# Patient Record
Sex: MALE | Race: WHITE | NOT HISPANIC OR LATINO | URBAN - METROPOLITAN AREA
[De-identification: names, ages, dates, MRNs, and addresses within clinical notes are randomized per-mention and may not be internally consistent; named-entity substitution may affect disease eponyms.]

---
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
## 2022-04-10 ENCOUNTER — HISTORICAL (OUTPATIENT)
Dept: ADMINISTRATIVE | Facility: HOSPITAL | Age: 22
End: 2022-04-10

## 2022-04-11 ENCOUNTER — HISTORICAL (OUTPATIENT)
Dept: ADMINISTRATIVE | Facility: HOSPITAL | Age: 22
End: 2022-04-11

## 2022-04-28 VITALS
DIASTOLIC BLOOD PRESSURE: 74 MMHG | BODY MASS INDEX: 44.1 KG/M2 | SYSTOLIC BLOOD PRESSURE: 114 MMHG | HEIGHT: 71 IN | WEIGHT: 315 LBS

## 2022-04-28 VITALS
HEIGHT: 71 IN | SYSTOLIC BLOOD PRESSURE: 114 MMHG | WEIGHT: 315 LBS | DIASTOLIC BLOOD PRESSURE: 74 MMHG | BODY MASS INDEX: 44.1 KG/M2

## 2022-09-21 ENCOUNTER — HISTORICAL (OUTPATIENT)
Dept: ADMINISTRATIVE | Facility: HOSPITAL | Age: 22
End: 2022-09-21

## 2023-02-09 ENCOUNTER — OFFICE VISIT (OUTPATIENT)
Dept: FAMILY MEDICINE | Facility: CLINIC | Age: 23
End: 2023-02-09
Payer: COMMERCIAL

## 2023-02-09 VITALS
SYSTOLIC BLOOD PRESSURE: 122 MMHG | WEIGHT: 315 LBS | TEMPERATURE: 99 F | DIASTOLIC BLOOD PRESSURE: 72 MMHG | HEART RATE: 84 BPM | HEIGHT: 72 IN | BODY MASS INDEX: 42.66 KG/M2 | OXYGEN SATURATION: 95 % | RESPIRATION RATE: 18 BRPM

## 2023-02-09 DIAGNOSIS — J02.0 STREP THROAT: Primary | ICD-10-CM

## 2023-02-09 DIAGNOSIS — J02.9 SORE THROAT: ICD-10-CM

## 2023-02-09 DIAGNOSIS — G47.33 OBSTRUCTIVE SLEEP APNEA: ICD-10-CM

## 2023-02-09 LAB
CTP QC/QA: YES
S PYO RRNA THROAT QL PROBE: POSITIVE

## 2023-02-09 PROCEDURE — 1159F MED LIST DOCD IN RCRD: CPT | Mod: CPTII,,, | Performed by: NURSE PRACTITIONER

## 2023-02-09 PROCEDURE — 1160F PR REVIEW ALL MEDS BY PRESCRIBER/CLIN PHARMACIST DOCUMENTED: ICD-10-PCS | Mod: CPTII,,, | Performed by: NURSE PRACTITIONER

## 2023-02-09 PROCEDURE — 3008F BODY MASS INDEX DOCD: CPT | Mod: CPTII,,, | Performed by: NURSE PRACTITIONER

## 2023-02-09 PROCEDURE — 3078F DIAST BP <80 MM HG: CPT | Mod: CPTII,,, | Performed by: NURSE PRACTITIONER

## 2023-02-09 PROCEDURE — 3074F SYST BP LT 130 MM HG: CPT | Mod: CPTII,,, | Performed by: NURSE PRACTITIONER

## 2023-02-09 PROCEDURE — 3078F PR MOST RECENT DIASTOLIC BLOOD PRESSURE < 80 MM HG: ICD-10-PCS | Mod: CPTII,,, | Performed by: NURSE PRACTITIONER

## 2023-02-09 PROCEDURE — 3008F PR BODY MASS INDEX (BMI) DOCUMENTED: ICD-10-PCS | Mod: CPTII,,, | Performed by: NURSE PRACTITIONER

## 2023-02-09 PROCEDURE — 1160F RVW MEDS BY RX/DR IN RCRD: CPT | Mod: CPTII,,, | Performed by: NURSE PRACTITIONER

## 2023-02-09 PROCEDURE — 1159F PR MEDICATION LIST DOCUMENTED IN MEDICAL RECORD: ICD-10-PCS | Mod: CPTII,,, | Performed by: NURSE PRACTITIONER

## 2023-02-09 PROCEDURE — 99213 PR OFFICE/OUTPT VISIT, EST, LEVL III, 20-29 MIN: ICD-10-PCS | Mod: ,,, | Performed by: NURSE PRACTITIONER

## 2023-02-09 PROCEDURE — 99213 OFFICE O/P EST LOW 20 MIN: CPT | Mod: ,,, | Performed by: NURSE PRACTITIONER

## 2023-02-09 PROCEDURE — 87880 STREP A ASSAY W/OPTIC: CPT | Mod: QW,,, | Performed by: NURSE PRACTITIONER

## 2023-02-09 PROCEDURE — 87880 POCT RAPID STREP A: ICD-10-PCS | Mod: QW,,, | Performed by: NURSE PRACTITIONER

## 2023-02-09 PROCEDURE — 3074F PR MOST RECENT SYSTOLIC BLOOD PRESSURE < 130 MM HG: ICD-10-PCS | Mod: CPTII,,, | Performed by: NURSE PRACTITIONER

## 2023-02-09 RX ORDER — AMOXICILLIN 875 MG/1
875 TABLET, FILM COATED ORAL EVERY 12 HOURS
Qty: 20 TABLET | Refills: 0 | Status: SHIPPED | OUTPATIENT
Start: 2023-02-09 | End: 2023-02-19

## 2023-02-09 NOTE — LETTER
February 9, 2023      [facility]  [address]  [place] [address]  Phone: [phone]  Fax: [phone]       Patient: Allen Villegas   YOB: 2000  Date of Visit: 02/09/2023    To Whom It May Concern:    Blaise Villegas  was at [facility] on 02/09/2023. The patient may return to work/school on 02/11/2023 with no restrictions. If you have any questions or concerns, or if I can be of further assistance, please do not hesitate to contact me.    Sincerely,    VICKEY Slaughter

## 2023-02-09 NOTE — PROGRESS NOTES
Subjective:       Patient ID: Allen Villegas is a 22 y.o. male.    Chief Complaint: Sore Throat (Since Monday) and Fever    Sore Throat   This is a new problem. The current episode started in the past 7 days. The problem has been gradually worsening. There has been no fever. Associated symptoms include congestion, coughing, headaches and a plugged ear sensation. Pertinent negatives include no abdominal pain, ear pain, shortness of breath or trouble swallowing. He has had exposure to strep.   Review of Systems   Constitutional:  Negative for activity change, appetite change, chills, fatigue and fever.   HENT:  Positive for nasal congestion and sore throat. Negative for ear pain, sinus pressure/congestion, tinnitus and trouble swallowing.    Eyes:  Negative for pain, discharge and eye dryness.   Respiratory:  Positive for cough. Negative for apnea, chest tightness and shortness of breath.    Cardiovascular:  Negative for chest pain.   Gastrointestinal:  Negative for abdominal distention and abdominal pain.   Endocrine: Negative for cold intolerance and heat intolerance.   Genitourinary:  Negative for bladder incontinence and difficulty urinating.   Musculoskeletal:  Negative for arthralgias, back pain and gait problem.   Allergic/Immunologic: Negative for environmental allergies, food allergies and frequent infections.   Neurological:  Positive for headaches. Negative for dizziness, coordination difficulties, memory loss and coordination difficulties.   Psychiatric/Behavioral:  Negative for sleep disturbance and suicidal ideas. The patient is not nervous/anxious.        Objective:      Physical Exam  Vitals and nursing note reviewed.   Constitutional:       Appearance: Normal appearance.   HENT:      Head: Normocephalic and atraumatic.      Right Ear: Tympanic membrane, ear canal and external ear normal.      Left Ear: Ear canal and external ear normal. Tympanic membrane is erythematous.      Nose: Nose normal.       Mouth/Throat:      Mouth: Mucous membranes are moist.      Pharynx: Oropharynx is clear. Posterior oropharyngeal erythema present.   Eyes:      Extraocular Movements: Extraocular movements intact.      Conjunctiva/sclera: Conjunctivae normal.      Pupils: Pupils are equal, round, and reactive to light.   Cardiovascular:      Rate and Rhythm: Normal rate and regular rhythm.      Pulses: Normal pulses.      Heart sounds: Normal heart sounds.   Pulmonary:      Effort: Pulmonary effort is normal.      Breath sounds: Normal breath sounds.   Abdominal:      General: Abdomen is flat. Bowel sounds are normal.      Palpations: Abdomen is soft.   Musculoskeletal:         General: Normal range of motion.      Cervical back: Normal range of motion.   Skin:     General: Skin is warm and dry.      Capillary Refill: Capillary refill takes less than 2 seconds.   Neurological:      General: No focal deficit present.      Mental Status: He is alert and oriented to person, place, and time.   Psychiatric:         Attention and Perception: Attention and perception normal.         Mood and Affect: Mood and affect normal.         Speech: Speech normal.         Behavior: Behavior normal. Behavior is cooperative.         Thought Content: Thought content normal.         Cognition and Memory: Cognition normal.       Assessment/Plan:     Vitals:    02/09/23 1627   BP: 122/72   Pulse: 84   Resp: 18   Temp: 98.9 °F (37.2 °C)        1. Strep throat  Overview:  Amoxicillin bid x7days     Orders:  -     amoxicillin (AMOXIL) 875 MG tablet; Take 1 tablet (875 mg total) by mouth every 12 (twelve) hours. for 10 days  Dispense: 20 tablet; Refill: 0    2. Sore throat  -     POCT rapid strep A    3. Obstructive sleep apnea           Follow up if symptoms worsen or fail to improve.   Discussed the diagnosis, prognosis, plan of care, chronic nature of and indications for, risks and benefits of treatment for conditions.  Continue all medications as  prescribed unless otherwise noted.   Call if patient develops other symptoms or if not better in 2-3 days and sooner if worse. Emphasized the importance of compliance with all recommendations, including medication use and timely follow up. Instructed to return as noted be or sooner if patient develops any other problems or if there are any other additional questions or concerns.

## 2024-02-28 ENCOUNTER — OFFICE VISIT (OUTPATIENT)
Dept: FAMILY MEDICINE | Facility: CLINIC | Age: 24
End: 2024-02-28
Payer: COMMERCIAL

## 2024-02-28 VITALS
HEIGHT: 72 IN | HEART RATE: 75 BPM | BODY MASS INDEX: 42.66 KG/M2 | WEIGHT: 315 LBS | DIASTOLIC BLOOD PRESSURE: 70 MMHG | SYSTOLIC BLOOD PRESSURE: 121 MMHG | TEMPERATURE: 98 F | OXYGEN SATURATION: 99 % | RESPIRATION RATE: 20 BRPM

## 2024-02-28 DIAGNOSIS — K52.9 GASTROENTERITIS: ICD-10-CM

## 2024-02-28 DIAGNOSIS — J02.9 PHARYNGITIS, UNSPECIFIED ETIOLOGY: Primary | ICD-10-CM

## 2024-02-28 LAB
CTP QC/QA: YES
FLUAV AG NPH QL: NEGATIVE
FLUBV AG NPH QL: NEGATIVE
S PYO RRNA THROAT QL PROBE: NEGATIVE
SARS-COV-2 AG RESP QL IA.RAPID: NEGATIVE

## 2024-02-28 PROCEDURE — 99214 OFFICE O/P EST MOD 30 MIN: CPT | Mod: ,,, | Performed by: NURSE PRACTITIONER

## 2024-02-28 PROCEDURE — 3074F SYST BP LT 130 MM HG: CPT | Mod: CPTII,,, | Performed by: NURSE PRACTITIONER

## 2024-02-28 PROCEDURE — 87880 STREP A ASSAY W/OPTIC: CPT | Mod: QW,,, | Performed by: NURSE PRACTITIONER

## 2024-02-28 PROCEDURE — 87400 INFLUENZA A/B EACH AG IA: CPT | Mod: 59,QW,, | Performed by: NURSE PRACTITIONER

## 2024-02-28 PROCEDURE — 3008F BODY MASS INDEX DOCD: CPT | Mod: CPTII,,, | Performed by: NURSE PRACTITIONER

## 2024-02-28 PROCEDURE — 3078F DIAST BP <80 MM HG: CPT | Mod: CPTII,,, | Performed by: NURSE PRACTITIONER

## 2024-02-28 PROCEDURE — 87811 SARS-COV-2 COVID19 W/OPTIC: CPT | Mod: QW,,, | Performed by: NURSE PRACTITIONER

## 2024-02-28 PROCEDURE — 1159F MED LIST DOCD IN RCRD: CPT | Mod: CPTII,,, | Performed by: NURSE PRACTITIONER

## 2024-02-28 PROCEDURE — 1160F RVW MEDS BY RX/DR IN RCRD: CPT | Mod: CPTII,,, | Performed by: NURSE PRACTITIONER

## 2024-02-28 RX ORDER — ONDANSETRON HYDROCHLORIDE 8 MG/1
8 TABLET, FILM COATED ORAL EVERY 8 HOURS PRN
Qty: 14 TABLET | Refills: 0 | Status: SHIPPED | OUTPATIENT
Start: 2024-02-28

## 2024-02-28 NOTE — PROGRESS NOTES
Subjective:       Patient ID: Allen Villegas is a 23 y.o. male.    Chief Complaint: Nausea, Cough (Started 5 days ago), Nasal Congestion, vomiting (Last night), and Sore Throat    Nausea  Associated symptoms include abdominal pain, congestion, coughing, fatigue, headaches, nausea, a sore throat and vomiting. Pertinent negatives include no neck pain or swollen glands.   Cough  Associated symptoms include headaches and a sore throat. Pertinent negatives include no ear pain or shortness of breath.   Sore Throat   This is a new problem. The current episode started in the past 7 days. The problem has been waxing and waning. Neither side of throat is experiencing more pain than the other. There has been no fever. The pain is at a severity of 6/10. The pain is mild. Associated symptoms include abdominal pain, congestion, coughing, headaches, a hoarse voice, a plugged ear sensation and vomiting. Pertinent negatives include no diarrhea, drooling, ear discharge, ear pain, neck pain, shortness of breath, stridor, swollen glands or trouble swallowing. He has had no exposure to strep or mono. He has tried NSAIDs for the symptoms. The treatment provided mild relief.     Review of Systems   Constitutional:  Positive for appetite change and fatigue.   HENT:  Positive for nasal congestion, hoarse voice and sore throat. Negative for drooling, ear discharge, ear pain and trouble swallowing.    Respiratory:  Positive for cough. Negative for shortness of breath and stridor.    Gastrointestinal:  Positive for abdominal pain, nausea and vomiting. Negative for diarrhea.   Musculoskeletal: Negative.  Negative for neck pain.   Integumentary:  Negative.   Neurological:  Positive for headaches.   Hematological: Negative.    Psychiatric/Behavioral: Negative.           Objective:      Vitals:    02/28/24 1058   BP: 121/70   Pulse: 75   Resp: 20   Temp: 97.8 °F (36.6 °C)   SpO2: 99%   Weight: (!) 146.5 kg (323 lb)   Height: 6' (1.829 m)        Physical Exam  Vitals and nursing note reviewed.   HENT:      Nose: Rhinorrhea present.      Mouth/Throat:      Pharynx: Posterior oropharyngeal erythema present.   Cardiovascular:      Rate and Rhythm: Normal rate and regular rhythm.      Pulses: Normal pulses.      Heart sounds: Normal heart sounds.   Pulmonary:      Effort: Pulmonary effort is normal.      Breath sounds: Normal breath sounds.   Abdominal:      General: Bowel sounds are normal.      Palpations: Abdomen is soft.   Musculoskeletal:         General: Normal range of motion.      Cervical back: Normal range of motion.   Skin:     General: Skin is warm.   Neurological:      Mental Status: He is oriented to person, place, and time.   Psychiatric:         Mood and Affect: Mood normal.         Assessment/Plan:     1. Pharyngitis, unspecified etiology  Assessment & Plan:  Negative strept, flu, covid. Comfort measures.     Orders:  -     POCT Influenza A/B  -     POCT rapid strep A  -     SARS Coronavirus 2 Antigen, POCT Manual Read    2. Gastroenteritis  Assessment & Plan:  Zorfan 8 mg tid with bland diet. Call any problems.     Orders:  -     ondansetron (ZOFRAN) 8 MG tablet; Take 1 tablet (8 mg total) by mouth every 8 (eight) hours as needed for Nausea.  Dispense: 14 tablet; Refill: 0       No follow-ups on file.          Discussed the diagnosis, prognosis, plan of care, chronic nature of and indications for, risks and benefits of treatment for conditions.  Continue all medications as prescribed unless otherwise noted.   Call if patient develops other symptoms or if not better in 2-3 days and sooner if worse. Emphasized the importance of compliance with all recommendations, including medication use and timely follow up. Instructed to return as noted be or sooner if patient develops any other problems or if there are any other additional questions or concerns.

## 2024-02-28 NOTE — LETTER
February 28, 2024      [facility]  [address] [place] [address]  Phone: [phone]  Fax: [phone]       Patient: Allen Villegas   YOB: 2000  Date of Visit: 02/28/2024    To Whom It May Concern:    Blaise Villegas  was at [facility] on 02/28/2024. The patient may return to work on 02/29/2024 with no restrictions. If you have any questions or concerns, or if I can be of further assistance, please do not hesitate to contact me.    Sincerely,    Pat Larios LPN

## 2024-03-01 LAB — S PYO THROAT QL CULT: NEGATIVE

## 2024-04-11 ENCOUNTER — OFFICE VISIT (OUTPATIENT)
Dept: FAMILY MEDICINE | Facility: CLINIC | Age: 24
End: 2024-04-11
Payer: COMMERCIAL

## 2024-04-11 VITALS
HEART RATE: 106 BPM | SYSTOLIC BLOOD PRESSURE: 122 MMHG | DIASTOLIC BLOOD PRESSURE: 74 MMHG | OXYGEN SATURATION: 96 % | WEIGHT: 315 LBS | BODY MASS INDEX: 42.66 KG/M2 | RESPIRATION RATE: 20 BRPM | HEIGHT: 72 IN | TEMPERATURE: 99 F

## 2024-04-11 DIAGNOSIS — R50.9 FEVER, UNSPECIFIED FEVER CAUSE: ICD-10-CM

## 2024-04-11 DIAGNOSIS — U07.1 COVID: Primary | ICD-10-CM

## 2024-04-11 LAB
CTP QC/QA: YES
FLUAV AG NPH QL: NEGATIVE
FLUBV AG NPH QL: NEGATIVE
S PYO RRNA THROAT QL PROBE: NEGATIVE
SARS-COV-2 AG RESP QL IA.RAPID: POSITIVE

## 2024-04-11 PROCEDURE — 87880 STREP A ASSAY W/OPTIC: CPT | Mod: QW,,, | Performed by: NURSE PRACTITIONER

## 2024-04-11 PROCEDURE — 87811 SARS-COV-2 COVID19 W/OPTIC: CPT | Mod: QW,,, | Performed by: NURSE PRACTITIONER

## 2024-04-11 PROCEDURE — 96372 THER/PROPH/DIAG INJ SC/IM: CPT | Mod: ,,, | Performed by: NURSE PRACTITIONER

## 2024-04-11 PROCEDURE — 99214 OFFICE O/P EST MOD 30 MIN: CPT | Mod: 25,,, | Performed by: NURSE PRACTITIONER

## 2024-04-11 PROCEDURE — 3078F DIAST BP <80 MM HG: CPT | Mod: CPTII,,, | Performed by: NURSE PRACTITIONER

## 2024-04-11 PROCEDURE — 3074F SYST BP LT 130 MM HG: CPT | Mod: CPTII,,, | Performed by: NURSE PRACTITIONER

## 2024-04-11 PROCEDURE — 1160F RVW MEDS BY RX/DR IN RCRD: CPT | Mod: CPTII,,, | Performed by: NURSE PRACTITIONER

## 2024-04-11 PROCEDURE — 87400 INFLUENZA A/B EACH AG IA: CPT | Mod: QW,,, | Performed by: NURSE PRACTITIONER

## 2024-04-11 PROCEDURE — 3008F BODY MASS INDEX DOCD: CPT | Mod: CPTII,,, | Performed by: NURSE PRACTITIONER

## 2024-04-11 PROCEDURE — 1159F MED LIST DOCD IN RCRD: CPT | Mod: CPTII,,, | Performed by: NURSE PRACTITIONER

## 2024-04-11 RX ORDER — FLUTICASONE PROPIONATE 50 MCG
2 SPRAY, SUSPENSION (ML) NASAL DAILY
Qty: 16 G | Refills: 0 | Status: SHIPPED | OUTPATIENT
Start: 2024-04-11 | End: 2024-05-11

## 2024-04-11 RX ORDER — DEXAMETHASONE SODIUM PHOSPHATE 100 MG/10ML
10 INJECTION INTRAMUSCULAR; INTRAVENOUS
Status: COMPLETED | OUTPATIENT
Start: 2024-04-11 | End: 2024-04-11

## 2024-04-11 RX ORDER — PSEUDOEPHEDRINE HYDROCHLORIDE 60 MG/1
60 TABLET ORAL EVERY 6 HOURS PRN
Qty: 30 TABLET | Refills: 0 | Status: SHIPPED | OUTPATIENT
Start: 2024-04-11 | End: 2024-04-21

## 2024-04-11 RX ADMIN — DEXAMETHASONE SODIUM PHOSPHATE 10 MG: 100 INJECTION INTRAMUSCULAR; INTRAVENOUS at 03:04

## 2024-04-11 NOTE — PROGRESS NOTES
SUBJECTIVE:  Allen Villegas is a 23 y.o. male here alone for Nasal Congestion, Cough, Fever, and Sore Throat      URI   This is a new problem. The current episode started in the past 7 days. The problem has been gradually improving. There has been no fever. Associated symptoms include congestion, coughing, a plugged ear sensation and a sore throat. He has tried NSAIDs for the symptoms. The treatment provided mild relief.     Pt in clinic with symptoms of sore throat, congestion and slight cough. Denies to having fever. Symptoms started on Tuesday. Started taking advil.    Leandras allergies, medications, history, and problem list were updated as appropriate.    Review of Systems   HENT:  Positive for congestion and sore throat.    Respiratory:  Positive for cough.       A comprehensive review of symptoms was completed and negative except as noted above.    OBJECTIVE:  Vital signs  Vitals:    04/11/24 1507   BP: 122/74   BP Location: Left arm   Patient Position: Sitting   Pulse: 106   Resp: 20   Temp: 98.6 °F (37 °C)   SpO2: 96%   Weight: (!) 149.2 kg (329 lb)   Height: 6' (1.829 m)        Physical Exam  Vitals and nursing note reviewed.   Constitutional:       Appearance: Normal appearance.   HENT:      Head: Normocephalic and atraumatic.      Right Ear: Tympanic membrane, ear canal and external ear normal.      Left Ear: Tympanic membrane, ear canal and external ear normal.      Nose: Nose normal.      Mouth/Throat:      Mouth: Mucous membranes are moist.      Pharynx: Oropharynx is clear. Posterior oropharyngeal erythema present.   Eyes:      Extraocular Movements: Extraocular movements intact.      Conjunctiva/sclera: Conjunctivae normal.      Pupils: Pupils are equal, round, and reactive to light.   Cardiovascular:      Rate and Rhythm: Normal rate and regular rhythm.      Pulses: Normal pulses.      Heart sounds: Normal heart sounds.   Pulmonary:      Effort: Pulmonary effort is normal.      Breath sounds: Normal  breath sounds.   Abdominal:      General: Abdomen is flat. Bowel sounds are normal.      Palpations: Abdomen is soft.   Musculoskeletal:         General: Normal range of motion.      Cervical back: Normal range of motion.   Skin:     General: Skin is warm and dry.      Capillary Refill: Capillary refill takes less than 2 seconds.   Neurological:      General: No focal deficit present.      Mental Status: He is alert and oriented to person, place, and time.   Psychiatric:         Attention and Perception: Attention and perception normal.         Mood and Affect: Mood and affect normal.         Speech: Speech normal.         Behavior: Behavior normal. Behavior is cooperative.         Thought Content: Thought content normal.         Cognition and Memory: Cognition normal.          Office Visit on 04/11/2024   Component Date Value Ref Range Status    Rapid Influenza A Ag 04/11/2024 Negative  Negative Final    Rapid Influenza B Ag 04/11/2024 Negative  Negative Final     Acceptable 04/11/2024 Yes   Final    Rapid Strep A Screen 04/11/2024 Negative  Negative Final     Acceptable 04/11/2024 Yes   Final    SARS Coronavirus 2 Antigen 04/11/2024 Positive (A)  Negative Final     Acceptable 04/11/2024 Yes   Final          ASSESSMENT/PLAN:    1. COVID  -     dexAMETHasone injection 10 mg  -     fluticasone propionate (FLONASE) 50 mcg/actuation nasal spray; 2 sprays (100 mcg total) by Each Nostril route once daily.  Dispense: 16 g; Refill: 0  -     pseudoephedrine (SUDOGEST) 60 MG tablet; Take 1 tablet (60 mg total) by mouth every 6 (six) hours as needed for Congestion.  Dispense: 30 tablet; Refill: 0    2. Fever, unspecified fever cause  -     POCT Influenza A/B  -     POCT rapid strep A  -     SARS Coronavirus 2 Antigen, POCT Manual Read          Recent Results (from the past 24 hour(s))   SARS Coronavirus 2 Antigen, POCT Manual Read    Collection Time: 04/11/24  3:23 PM   Result  Value Ref Range    SARS Coronavirus 2 Antigen Positive (A) Negative     Acceptable Yes    POCT Influenza A/B    Collection Time: 04/11/24  3:24 PM   Result Value Ref Range    Rapid Influenza A Ag Negative Negative    Rapid Influenza B Ag Negative Negative     Acceptable Yes    POCT rapid strep A    Collection Time: 04/11/24  3:24 PM   Result Value Ref Range    Rapid Strep A Screen Negative Negative     Acceptable Yes        Follow Up:  Follow up if symptoms worsen or fail to improve.      GENERAL HOME INSTRUCTIONS:    If symptoms have not improved in the next 48 hours, please call our office directly at [phone].  Alternatively, you can send a non-urgent message to us via [facility].      For afterhours questions or advice, please do not hesitate to call our number [phone]

## 2024-04-11 NOTE — LETTER
April 11, 2024      [facility]  [address] [place] [id number]  Phone: [phone]  Fax: [phone]       Patient: Allen Villegas   YOB: 2000  Date of Visit: 04/11/2024    To Whom It May Concern:    Blaise Villegas  was at [facility] on 04/11/2024. The patient may return to work on 04/15/2024 with no restrictions. If you have any questions or concerns, or if I can be of further assistance, please do not hesitate to contact me.    Sincerely,    Pta Larios LPN